# Patient Record
Sex: FEMALE | Race: WHITE | Employment: FULL TIME | URBAN - METROPOLITAN AREA
[De-identification: names, ages, dates, MRNs, and addresses within clinical notes are randomized per-mention and may not be internally consistent; named-entity substitution may affect disease eponyms.]

---

## 2018-01-02 PROCEDURE — 87591 N.GONORRHOEAE DNA AMP PROB: CPT | Performed by: OBSTETRICS & GYNECOLOGY

## 2018-01-02 PROCEDURE — 87491 CHLMYD TRACH DNA AMP PROBE: CPT | Performed by: OBSTETRICS & GYNECOLOGY

## 2018-01-02 PROCEDURE — 88175 CYTOPATH C/V AUTO FLUID REDO: CPT | Performed by: OBSTETRICS & GYNECOLOGY

## 2018-04-11 ENCOUNTER — OFFICE VISIT (OUTPATIENT)
Dept: RHEUMATOLOGY | Facility: CLINIC | Age: 23
End: 2018-04-11

## 2018-04-11 ENCOUNTER — APPOINTMENT (OUTPATIENT)
Dept: LAB | Age: 23
End: 2018-04-11
Attending: INTERNAL MEDICINE
Payer: COMMERCIAL

## 2018-04-11 VITALS
HEART RATE: 74 BPM | DIASTOLIC BLOOD PRESSURE: 72 MMHG | TEMPERATURE: 98 F | BODY MASS INDEX: 27.29 KG/M2 | HEIGHT: 63 IN | WEIGHT: 154 LBS | SYSTOLIC BLOOD PRESSURE: 119 MMHG

## 2018-04-11 DIAGNOSIS — L50.9 URTICARIA: ICD-10-CM

## 2018-04-11 DIAGNOSIS — R76.8 POSITIVE ANA (ANTINUCLEAR ANTIBODY): ICD-10-CM

## 2018-04-11 DIAGNOSIS — R76.8 POSITIVE ANA (ANTINUCLEAR ANTIBODY): Primary | ICD-10-CM

## 2018-04-11 PROCEDURE — 86225 DNA ANTIBODY NATIVE: CPT

## 2018-04-11 PROCEDURE — 86431 RHEUMATOID FACTOR QUANT: CPT

## 2018-04-11 PROCEDURE — 86160 COMPLEMENT ANTIGEN: CPT

## 2018-04-11 PROCEDURE — 86235 NUCLEAR ANTIGEN ANTIBODY: CPT

## 2018-04-11 PROCEDURE — 99212 OFFICE O/P EST SF 10 MIN: CPT | Performed by: INTERNAL MEDICINE

## 2018-04-11 PROCEDURE — 36415 COLL VENOUS BLD VENIPUNCTURE: CPT

## 2018-04-11 PROCEDURE — 99244 OFF/OP CNSLTJ NEW/EST MOD 40: CPT | Performed by: INTERNAL MEDICINE

## 2018-04-11 NOTE — PROGRESS NOTES
Dear Eunice Chin, PAC:    I saw Rosa Bustillo in consultation this morning at your request for evaluation of urticaria and MARILYN [id number] speckled.   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 Her skin is not sensitive to the sun. No alopecia, internal eye inflammation, oral and nasal ulcers, lymphadenopathy. No shortness of breath or chest pain. She had blepharitis at the end of January.   She has acid reflux and takes Rheumatology.

## 2018-04-18 ENCOUNTER — TELEPHONE (OUTPATIENT)
Dept: RHEUMATOLOGY | Facility: CLINIC | Age: 23
End: 2018-04-18

## 2018-04-18 NOTE — TELEPHONE ENCOUNTER
I left a message on Valeria's answering machine. Her labs from last week were normal.  Double-stranded DNA, RNP, Bowman, SSA, and SSB antibodies were negative. C3 and C4 complements were normal.  Rheumatoid factor was negative.     I reassured her that h